# Patient Record
Sex: FEMALE | Race: BLACK OR AFRICAN AMERICAN | Employment: PART TIME | URBAN - METROPOLITAN AREA
[De-identification: names, ages, dates, MRNs, and addresses within clinical notes are randomized per-mention and may not be internally consistent; named-entity substitution may affect disease eponyms.]

---
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
## 2021-06-23 ENCOUNTER — OFFICE VISIT (OUTPATIENT)
Dept: PRIMARY CARE CLINIC | Age: 21
End: 2021-06-23
Payer: COMMERCIAL

## 2021-06-23 VITALS
HEIGHT: 68 IN | OXYGEN SATURATION: 98 % | WEIGHT: 192.4 LBS | DIASTOLIC BLOOD PRESSURE: 72 MMHG | TEMPERATURE: 97.8 F | SYSTOLIC BLOOD PRESSURE: 120 MMHG | BODY MASS INDEX: 29.16 KG/M2 | HEART RATE: 95 BPM

## 2021-06-23 DIAGNOSIS — Z00.00 PHYSICAL EXAM: Primary | ICD-10-CM

## 2021-06-23 DIAGNOSIS — N92.6 MENSTRUAL PERIODS IRREGULAR: ICD-10-CM

## 2021-06-23 LAB
CONTROL: POSITIVE
PREGNANCY TEST URINE, POC: NEGATIVE

## 2021-06-23 PROCEDURE — 81025 URINE PREGNANCY TEST: CPT | Performed by: FAMILY MEDICINE

## 2021-06-23 PROCEDURE — 99213 OFFICE O/P EST LOW 20 MIN: CPT | Performed by: FAMILY MEDICINE

## 2021-06-23 PROCEDURE — 99395 PREV VISIT EST AGE 18-39: CPT | Performed by: FAMILY MEDICINE

## 2021-06-23 PROCEDURE — 90620 MENB-4C VACCINE IM: CPT | Performed by: FAMILY MEDICINE

## 2021-06-23 NOTE — PROGRESS NOTES
Social History     Tobacco Use   Smoking Status Never Smoker   Smokeless Tobacco Never Used     PMH, surgeries, SH, FH, allergies reviewed. Medication list reviewed. Chief Complaint   Patient presents with    Annual Exam     irregular periods. As above,  Complaints of irregular periods. Past month every month to 3 months. Light to heavy flow. Sexually active. Menarche 16 y/o with regular periods until recently. Review of Systems    The current method of family planning is: condom   Patient's last menstrual period was 04/04/2021.       Immunization History   Administered Date(s) Administered    DTaP 01/08/2002, 04/09/2002, 05/14/2002, 02/25/2003, 10/20/2006    HPV Quadrivalent (Gardasil) 01/19/2015, 04/01/2015, 08/05/2015    Hepatitis A Ped/Adol (Havrix, Vaqta) 10/18/2017, 07/08/2019    Hepatitis B 2001, 01/08/2002, 06/18/2002    Hib, unspecified 01/08/2002, 04/09/2002, 05/15/2002, 02/25/2003    Influenza Virus Vaccine 01/19/2015    Influenza, Demetrius, IM, PF (6 mo and older Fluzone, Flulaval, Fluarix, and 3 yrs and older Afluria) 10/18/2017    MMR 02/25/2003, 10/20/2006    Meningococcal B, OMV (Bexsero) 06/23/2021    Meningococcal MCV4P (Menactra) 01/19/2015, 10/18/2017    Pneumococcal Conjugate 7-valent (Prevnar7) 01/08/2002, 04/09/2002    Polio IPV (IPOL) 01/08/2002, 04/09/2002, 05/14/2002, 10/20/2006    Tdap (Boostrix, Adacel) 02/15/2012    Varicella (Varivax) 03/19/2003, 10/18/2017       Health Maintenance Due   Topic Date Due    Hepatitis C screen  Never done    COVID-19 Vaccine (1) Never done    HIV screen  Never done    Chlamydia screen  07/08/2020     Ever smoker: no  Alcohol use: no  Illicit drug use:no    She exercises regularly      Regular dental exam: advised to schedule  Regular eye exam: UTD    Family History   Problem Relation Age of Onset    Hypertension Mother     High Blood Pressure Mother     Diabetes Mother     Hypertension Maternal Grandmother     High Blood Pressure Maternal Grandmother        Breast cancer: no  Ovarian cancer: no  Colon cancer: no  Lung cancer: no  Any other cancer: no  CVA: no  MI: no  DM: mother  Hypertension: mother     Objective:   VS reviewed    Vitals:    06/23/21 1311   BP: 120/72   Site: Left Upper Arm   Position: Sitting   Cuff Size: Medium Adult   Pulse: 95   Temp: 97.8 °F (36.6 °C)   TempSrc: Infrared   SpO2: 98%   Weight: 192 lb 6.4 oz (87.3 kg)   Height: 5' 7.5\" (1.715 m)     Body mass index is 29.69 kg/m². Wt Readings from Last 3 Encounters:   06/23/21 192 lb 6.4 oz (87.3 kg) (97 %, Z= 1.82)*   07/08/19 152 lb (68.9 kg) (86 %, Z= 1.08)*   03/04/19 159 lb 6.4 oz (72.3 kg) (90 %, Z= 1.30)*     * Growth percentiles are based on CDC (Girls, 2-20 Years) data. BP Readings from Last 3 Encounters:   06/23/21 120/72   07/08/19 120/80 (77 %, Z = 0.75 /  92 %, Z = 1.44)*   03/04/19 104/72 (21 %, Z = -0.81 /  71 %, Z = 0.55)*     *BP percentiles are based on the 2017 AAP Clinical Practice Guideline for girls       Physical Exam  Constitutional:       General: She is not in acute distress. Appearance: Normal appearance. HENT:      Right Ear: Tympanic membrane and ear canal normal.      Left Ear: Tympanic membrane and ear canal normal.      Mouth/Throat:      Mouth: Mucous membranes are moist.      Pharynx: Oropharynx is clear. No oropharyngeal exudate or posterior oropharyngeal erythema. Eyes:      General:         Right eye: No discharge. Left eye: No discharge. Conjunctiva/sclera: Conjunctivae normal.      Pupils: Pupils are equal, round, and reactive to light. Neck:      Thyroid: No thyromegaly. Cardiovascular:      Rate and Rhythm: Normal rate and regular rhythm. Pulses: Normal pulses. Heart sounds: Normal heart sounds. No murmur heard. Pulmonary:      Effort: Pulmonary effort is normal. No respiratory distress. Breath sounds: Normal breath sounds.    Abdominal:      General: Bowel sounds are normal. There is no distension. Palpations: Abdomen is soft. Tenderness: There is no abdominal tenderness. Musculoskeletal:         General: No tenderness. Right lower leg: No edema. Left lower leg: No edema. Lymphadenopathy:      Cervical: No cervical adenopathy. Skin:     Capillary Refill: Capillary refill takes less than 2 seconds. Coloration: Skin is not jaundiced or pale. Findings: No bruising, erythema, lesion or rash. Neurological:      Mental Status: She is alert and oriented to person, place, and time. Psychiatric:         Mood and Affect: Mood normal.         Behavior: Behavior normal.         Thought Content: Thought content normal.         Judgment: Judgment normal.       Assessment/Plan:    1. Physical exam  - Chlamydia trachomatis DNA, Urine; Future  - HEPATITIS C ANTIBODY; Future  - HIV Screen; Future  - Vitamin D 25 Hydroxy; Future  - Lipid Panel; Future    2. Menstrual periods irregular  Further eval as listed below. Possible causes discussed: weight gain, PCOS, stress. Patient does not want to start oral birth control at this time. The patient is advised to continue progressive daily aerobic exercise program, attempt to lose weight and reduce exposure to stress.    - CBC Auto Differential; Future  - Comprehensive Metabolic Panel; Future  - Hemoglobin A1C; Future  - DHEA; Future  - Vitamin D 25 Hydroxy; Future  - TSH with Reflex; Future  - US PELVIS LIMITED; Future  - POCT urine pregnancy    Health Maintenance reviewed - COVID19 vaccine recommended. Return for routine annual checkups.     Orders Placed This Encounter   Procedures    Chlamydia trachomatis DNA, Urine    US PELVIS LIMITED    Meningococcal B, OMV (age 6y-22y) IM (BEXSERO)    HEPATITIS C ANTIBODY    HIV Screen    CBC Auto Differential    Comprehensive Metabolic Panel    Hemoglobin A1C    DHEA    Vitamin D 25 Hydroxy    TSH with Reflex    Lipid Panel    POCT urine pregnancy       No

## 2021-07-02 ENCOUNTER — HOSPITAL ENCOUNTER (OUTPATIENT)
Age: 21
Discharge: HOME | End: 2021-07-02
Payer: COMMERCIAL

## 2021-07-02 ENCOUNTER — HOSPITAL ENCOUNTER (OUTPATIENT)
Dept: ULTRASOUND IMAGING | Age: 21
Discharge: HOME | End: 2021-07-02
Payer: COMMERCIAL

## 2021-07-02 DIAGNOSIS — Z00.00 PHYSICAL EXAM: ICD-10-CM

## 2021-07-02 DIAGNOSIS — N92.6 MENSTRUAL PERIODS IRREGULAR: ICD-10-CM

## 2021-07-02 LAB
A/G RATIO: 1.3 (ref 1.1–2.2)
ALBUMIN SERPL-MCNC: 4 G/DL (ref 3.4–5)
ALP BLD-CCNC: 95 U/L (ref 40–129)
ALT SERPL-CCNC: 11 U/L (ref 10–40)
ANION GAP SERPL CALCULATED.3IONS-SCNC: 13 MMOL/L (ref 3–16)
AST SERPL-CCNC: 18 U/L (ref 15–37)
BASOPHILS ABSOLUTE: 0.1 K/UL (ref 0–0.2)
BASOPHILS RELATIVE PERCENT: 1 %
BILIRUB SERPL-MCNC: 0.3 MG/DL (ref 0–1)
BUN BLDV-MCNC: 9 MG/DL (ref 7–20)
CALCIUM SERPL-MCNC: 9.2 MG/DL (ref 8.3–10.6)
CHLORIDE BLD-SCNC: 101 MMOL/L (ref 99–110)
CHOLESTEROL, TOTAL: 127 MG/DL (ref 0–199)
CO2: 23 MMOL/L (ref 21–32)
CREAT SERPL-MCNC: 0.9 MG/DL (ref 0.6–1.1)
EOSINOPHILS ABSOLUTE: 0.2 K/UL (ref 0–0.6)
EOSINOPHILS RELATIVE PERCENT: 2.4 %
GFR AFRICAN AMERICAN: >60
GFR NON-AFRICAN AMERICAN: >60
GLOBULIN: 3.2 G/DL
GLUCOSE BLD-MCNC: 96 MG/DL (ref 70–99)
HCT VFR BLD CALC: 39.3 % (ref 36–48)
HDLC SERPL-MCNC: 43 MG/DL (ref 40–60)
HEMOGLOBIN: 13.3 G/DL (ref 12–16)
HEPATITIS C ANTIBODY INTERPRETATION: NORMAL
LDL CHOLESTEROL CALCULATED: 78 MG/DL
LYMPHOCYTES ABSOLUTE: 1.8 K/UL (ref 1–5.1)
LYMPHOCYTES RELATIVE PERCENT: 27.8 %
MCH RBC QN AUTO: 29.3 PG (ref 26–34)
MCHC RBC AUTO-ENTMCNC: 33.8 G/DL (ref 31–36)
MCV RBC AUTO: 86.7 FL (ref 80–100)
MONOCYTES ABSOLUTE: 0.4 K/UL (ref 0–1.3)
MONOCYTES RELATIVE PERCENT: 6.6 %
NEUTROPHILS ABSOLUTE: 3.9 K/UL (ref 1.7–7.7)
NEUTROPHILS RELATIVE PERCENT: 62.2 %
PDW BLD-RTO: 13.2 % (ref 12.4–15.4)
PLATELET # BLD: 324 K/UL (ref 135–450)
PMV BLD AUTO: 8.5 FL (ref 5–10.5)
POTASSIUM SERPL-SCNC: 4.2 MMOL/L (ref 3.5–5.1)
RBC # BLD: 4.53 M/UL (ref 4–5.2)
SODIUM BLD-SCNC: 137 MMOL/L (ref 136–145)
TOTAL PROTEIN: 7.2 G/DL (ref 6.4–8.2)
TRIGL SERPL-MCNC: 32 MG/DL (ref 0–150)
TSH REFLEX: 1.38 UIU/ML (ref 0.43–4)
VITAMIN D 25-HYDROXY: 18.7 NG/ML
VLDLC SERPL CALC-MCNC: 6 MG/DL
WBC # BLD: 6.3 K/UL (ref 4–11)

## 2021-07-02 PROCEDURE — 84443 ASSAY THYROID STIM HORMONE: CPT

## 2021-07-02 PROCEDURE — 83036 HEMOGLOBIN GLYCOSYLATED A1C: CPT

## 2021-07-02 PROCEDURE — 87591 N.GONORRHOEAE DNA AMP PROB: CPT

## 2021-07-02 PROCEDURE — 85025 COMPLETE CBC W/AUTO DIFF WBC: CPT

## 2021-07-02 PROCEDURE — 87390 HIV-1 AG IA: CPT

## 2021-07-02 PROCEDURE — 86803 HEPATITIS C AB TEST: CPT

## 2021-07-02 PROCEDURE — 80053 COMPREHEN METABOLIC PANEL: CPT

## 2021-07-02 PROCEDURE — 76856 US EXAM PELVIC COMPLETE: CPT

## 2021-07-02 PROCEDURE — 87491 CHLMYD TRACH DNA AMP PROBE: CPT

## 2021-07-02 PROCEDURE — 86702 HIV-2 ANTIBODY: CPT

## 2021-07-02 PROCEDURE — 86701 HIV-1ANTIBODY: CPT

## 2021-07-02 PROCEDURE — 82306 VITAMIN D 25 HYDROXY: CPT

## 2021-07-02 PROCEDURE — 36415 COLL VENOUS BLD VENIPUNCTURE: CPT

## 2021-07-02 PROCEDURE — 82626 DEHYDROEPIANDROSTERONE: CPT

## 2021-07-02 PROCEDURE — 80061 LIPID PANEL: CPT

## 2021-07-03 LAB
ESTIMATED AVERAGE GLUCOSE: 105.4 MG/DL
HBA1C MFR BLD: 5.3 %
HIV AG/AB: NORMAL
HIV ANTIGEN: NORMAL
HIV-1 ANTIBODY: NORMAL
HIV-2 AB: NORMAL

## 2021-07-04 LAB
C. TRACHOMATIS DNA ,URINE: POSITIVE
N. GONORRHOEAE DNA, URINE: NEGATIVE

## 2021-07-07 LAB — DHEA: 8.65 NG/ML (ref 1.33–7.78)

## 2021-07-07 RX ORDER — DOXYCYCLINE HYCLATE 100 MG
100 TABLET ORAL 2 TIMES DAILY
Qty: 20 TABLET | Refills: 0 | Status: SHIPPED | OUTPATIENT
Start: 2021-07-07 | End: 2021-07-17

## 2023-05-19 ENCOUNTER — OFFICE VISIT (OUTPATIENT)
Dept: PRIMARY CARE CLINIC | Age: 23
End: 2023-05-19
Payer: COMMERCIAL

## 2023-05-19 VITALS
HEIGHT: 68 IN | BODY MASS INDEX: 26.98 KG/M2 | OXYGEN SATURATION: 99 % | DIASTOLIC BLOOD PRESSURE: 62 MMHG | HEART RATE: 77 BPM | SYSTOLIC BLOOD PRESSURE: 102 MMHG | WEIGHT: 178 LBS

## 2023-05-19 DIAGNOSIS — Z23 NEED FOR TDAP VACCINATION: ICD-10-CM

## 2023-05-19 DIAGNOSIS — Z00.00 ROUTINE GENERAL MEDICAL EXAMINATION AT A HEALTH CARE FACILITY: Primary | ICD-10-CM

## 2023-05-19 PROCEDURE — 90471 IMMUNIZATION ADMIN: CPT | Performed by: FAMILY MEDICINE

## 2023-05-19 PROCEDURE — 99395 PREV VISIT EST AGE 18-39: CPT | Performed by: FAMILY MEDICINE

## 2023-05-19 PROCEDURE — 90715 TDAP VACCINE 7 YRS/> IM: CPT | Performed by: FAMILY MEDICINE

## 2023-05-19 RX ORDER — VALACYCLOVIR HYDROCHLORIDE 500 MG/1
500 TABLET, FILM COATED ORAL 2 TIMES DAILY
Qty: 60 TABLET | Refills: 11 | COMMUNITY
Start: 2022-12-06 | End: 2023-12-06

## 2023-05-19 ASSESSMENT — PATIENT HEALTH QUESTIONNAIRE - PHQ9
SUM OF ALL RESPONSES TO PHQ QUESTIONS 1-9: 0
SUM OF ALL RESPONSES TO PHQ QUESTIONS 1-9: 0
SUM OF ALL RESPONSES TO PHQ9 QUESTIONS 1 & 2: 0
SUM OF ALL RESPONSES TO PHQ QUESTIONS 1-9: 0
2. FEELING DOWN, DEPRESSED OR HOPELESS: 0
SUM OF ALL RESPONSES TO PHQ QUESTIONS 1-9: 0
1. LITTLE INTEREST OR PLEASURE IN DOING THINGS: 0

## 2023-05-19 NOTE — PROGRESS NOTES
Chief Complaint   Patient presents with    Annual Exam     New pt here to [facility]. Here for a physical     Subjective:   Yuliya Lerner is a 25 y.o. female former patient of Dr. Gregoria Galeana, here to establish care, for a comprehensive physical exam.  Patient has no major medical problems. Patient has no concerns other than update her tetanus immunization. Goes to , completed 3 years going to 4th year, in [employer], 5 year program.    2023: [facility] [place] [address] clinic, seen Dr. Denisse De Jesus for contraception. Had GC/chlamydia urine check. Tests were negative. Recommend use of condoms    2022: [facility], seen МАРИНА Becerra Innocent, for STI check. Had HSV 2022. (+) For chlamydia, treated with doxycycline for 7 days    Reviewed test results on 2022: HSV 1 IgG 2.82, HSV 2 IgG less than 0.91    Reviewed test results on 2022: HSV-1 detected, HSV-2 negative     History:  LMP: Patient's last menstrual period was 2023. Menopause:NA  Last pap date: no baseline. Plans to go to [address] [place], [place] [address] clinic for her baseline Pap smear. Abnormal pap? NA  : 0  Para: 0    Past Medical History:   Diagnosis Date    Chlamydia infection 2022    HSV-1 infection 2022     History reviewed. No pertinent surgical history.   Family History   Problem Relation Age of Onset    High Blood Pressure Mother     Diabetes Mother     High Blood Pressure Maternal Grandmother      Social History     Socioeconomic History    Marital status: Single     Spouse name: None    Number of children: 0    Years of education: 7    Highest education level: None   Occupational History    Occupation: Student   Tobacco Use    Smoking status: Never    Smokeless tobacco: Never   Vaping Use    Vaping Use: Never used   Substance and Sexual Activity    Alcohol use: Yes    Drug use: No    Sexual activity: Yes     Partners: Male     Current Outpatient Medications   Medication Sig Dispense Refill

## 2023-12-29 ENCOUNTER — OFFICE VISIT (OUTPATIENT)
Dept: PRIMARY CARE CLINIC | Age: 23
End: 2023-12-29
Payer: COMMERCIAL

## 2023-12-29 VITALS
BODY MASS INDEX: 29.7 KG/M2 | WEIGHT: 196 LBS | RESPIRATION RATE: 16 BRPM | HEIGHT: 68 IN | OXYGEN SATURATION: 96 % | HEART RATE: 88 BPM | DIASTOLIC BLOOD PRESSURE: 64 MMHG | SYSTOLIC BLOOD PRESSURE: 108 MMHG

## 2023-12-29 DIAGNOSIS — B00.9 HSV INFECTION: Primary | ICD-10-CM

## 2023-12-29 PROCEDURE — 99213 OFFICE O/P EST LOW 20 MIN: CPT | Performed by: FAMILY MEDICINE

## 2023-12-29 RX ORDER — VALACYCLOVIR HYDROCHLORIDE 500 MG/1
500 TABLET, FILM COATED ORAL 2 TIMES DAILY
Qty: 6 TABLET | Refills: 3 | Status: SHIPPED | OUTPATIENT
Start: 2023-12-29 | End: 2024-01-01

## 2023-12-29 RX ORDER — VALACYCLOVIR HYDROCHLORIDE 500 MG/1
500 TABLET, FILM COATED ORAL 2 TIMES DAILY
COMMUNITY
End: 2023-12-29 | Stop reason: SDUPTHER